# Patient Record
Sex: FEMALE | Race: WHITE
[De-identification: names, ages, dates, MRNs, and addresses within clinical notes are randomized per-mention and may not be internally consistent; named-entity substitution may affect disease eponyms.]

---

## 2019-06-12 PROBLEM — Z00.00 ENCOUNTER FOR PREVENTIVE HEALTH EXAMINATION: Status: ACTIVE | Noted: 2019-06-12

## 2019-06-13 ENCOUNTER — APPOINTMENT (OUTPATIENT)
Dept: COLORECTAL SURGERY | Facility: CLINIC | Age: 69
End: 2019-06-13
Payer: MEDICARE

## 2019-06-13 VITALS
HEART RATE: 68 BPM | TEMPERATURE: 98.2 F | DIASTOLIC BLOOD PRESSURE: 81 MMHG | SYSTOLIC BLOOD PRESSURE: 125 MMHG | HEIGHT: 69 IN | WEIGHT: 126 LBS | BODY MASS INDEX: 18.66 KG/M2

## 2019-06-13 DIAGNOSIS — C44.319 BASAL CELL CARCINOMA OF SKIN OF OTHER PARTS OF FACE: ICD-10-CM

## 2019-06-13 DIAGNOSIS — G47.30 SLEEP APNEA, UNSPECIFIED: ICD-10-CM

## 2019-06-13 DIAGNOSIS — K62.89 OTHER SPECIFIED DISEASES OF ANUS AND RECTUM: ICD-10-CM

## 2019-06-13 DIAGNOSIS — Z80.0 FAMILY HISTORY OF MALIGNANT NEOPLASM OF DIGESTIVE ORGANS: ICD-10-CM

## 2019-06-13 DIAGNOSIS — Z82.49 FAMILY HISTORY OF ISCHEMIC HEART DISEASE AND OTHER DISEASES OF THE CIRCULATORY SYSTEM: ICD-10-CM

## 2019-06-13 PROCEDURE — 99203 OFFICE O/P NEW LOW 30 MIN: CPT | Mod: 25

## 2019-06-13 PROCEDURE — 46600 DIAGNOSTIC ANOSCOPY SPX: CPT

## 2019-06-13 RX ORDER — LEVOTHYROXINE SODIUM 0.05 MG/1
50 TABLET ORAL
Refills: 0 | Status: ACTIVE | COMMUNITY

## 2019-06-13 RX ORDER — PROGESTERONE 200 MG/1
CAPSULE ORAL
Refills: 0 | Status: ACTIVE | COMMUNITY

## 2019-06-13 RX ORDER — ESTRADIOL 2 MG/1
SYSTEM VAGINAL
Refills: 0 | Status: ACTIVE | COMMUNITY

## 2019-06-13 RX ORDER — ESTRADIOL 0.07 MG/D
0.07 PATCH TRANSDERMAL
Refills: 0 | Status: ACTIVE | COMMUNITY

## 2019-06-14 NOTE — PHYSICAL EXAM
[FreeTextEntry1] :  Medical assistant present for duration of physical examination\par \par Gen NAD, AOx3\par \par Anorectal Exam:\par Inspection no erythema, induration or fluctuance, no skin excoriation, no fissure, no significant external hemorrhoids/thrombosis\par VERNELL focal tenderness with palpation of anorectal ring left lateral, no masses palpated, no blood on gloved finger\par Anoscopy no fissure, nonfriable internal hemorrhoids\par \par

## 2019-06-14 NOTE — CONSULT LETTER
[Dear  ___] : Dear  [unfilled], [Consult Letter:] : I had the pleasure of evaluating your patient, [unfilled]. [( Thank you for referring [unfilled] for consultation for _____ )] : Thank you for referring [unfilled] for consultation for [unfilled] [Please see my note below.] : Please see my note below. [Consult Closing:] : Thank you very much for allowing me to participate in the care of this patient.  If you have any questions, please do not hesitate to contact me. [Sincerely,] : Sincerely, [FreeTextEntry2] : SHEFALI MCLAUGHLIN\par [address] \par  [place] [id number]\par  [FreeTextEntry3] : ROB SOMMERS MD

## 2019-06-14 NOTE — HISTORY OF PRESENT ILLNESS
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 Gets some relief w/ heat or cold packs.\par hx of mucous on stool last week, denies anal discharge\par Denies BPR\par Admits to intermittent FI when had diarrhea in 03/2019, has had few small bits of stool pass when was actively relaxing muscles\par Patient was seen by [facility] 5/2019, internal hemorrhoids noted, no fissure or abscess noted. Referred for pelvic MRI (negative) and patient started Pelvic floor PT last month, denies improvement\par \par BH: every 1-2 days w/ straining. \par Reports varying between constipation and diarrhea since dietary changes (avoiding foods w/ higher histamine), previously ate prunes. Reports adequate dietary fiber and water intake\par Takes magnesium supplements for constipation\par \par Denies hx of anal receptive sex\par Colonoscopy 5/24/19: hemorrhoids noted, otherwise normal.\par Denies [facility] colorectal CA\par

## 2019-06-14 NOTE — REVIEW OF SYSTEMS
[Easy Bleeding] : a tendency for easy bleeding [As Noted in HPI] : as noted in HPI [Easy Bruising] : a tendency for easy bruising [Negative] : Psychiatric [FreeTextEntry6] : sleep apnea [FreeTextEntry8] : nighttime urination

## 2019-06-14 NOTE — ASSESSMENT
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

## 2019-11-05 ENCOUNTER — NEW PATIENT (OUTPATIENT)
Dept: URBAN - METROPOLITAN AREA CLINIC 52 | Facility: CLINIC | Age: 69
End: 2019-11-05

## 2019-11-05 DIAGNOSIS — H25.13: ICD-10-CM

## 2019-11-05 DIAGNOSIS — H52.13: ICD-10-CM

## 2019-11-05 DIAGNOSIS — H43.393: ICD-10-CM

## 2019-11-05 PROCEDURE — 92225 OPHTHALMOSCOPY (INITIAL): CPT

## 2019-11-05 PROCEDURE — 99204 OFFICE O/P NEW MOD 45 MIN: CPT

## 2019-11-05 PROCEDURE — 92134 CPTRZ OPH DX IMG PST SGM RTA: CPT

## 2019-11-05 ASSESSMENT — VISUAL ACUITY
OD_CC: 20/30-2
OS_CC: 20/30
OS_CC: 20/30
OD_CC: 20/30-1

## 2019-11-05 ASSESSMENT — TONOMETRY
OS_IOP_MMHG: 10
OD_IOP_MMHG: 10